# Patient Record
Sex: MALE | Race: WHITE | NOT HISPANIC OR LATINO | Employment: OTHER | URBAN - METROPOLITAN AREA
[De-identification: names, ages, dates, MRNs, and addresses within clinical notes are randomized per-mention and may not be internally consistent; named-entity substitution may affect disease eponyms.]

---

## 2019-04-25 ENCOUNTER — OFFICE VISIT (OUTPATIENT)
Dept: URGENT CARE | Facility: CLINIC | Age: 59
End: 2019-04-25

## 2019-04-25 VITALS
HEIGHT: 68 IN | BODY MASS INDEX: 23.19 KG/M2 | OXYGEN SATURATION: 99 % | TEMPERATURE: 99 F | WEIGHT: 153 LBS | HEART RATE: 96 BPM | DIASTOLIC BLOOD PRESSURE: 83 MMHG | SYSTOLIC BLOOD PRESSURE: 127 MMHG

## 2019-04-25 DIAGNOSIS — N44.00 TORSION OF LEFT TESTICLE: ICD-10-CM

## 2019-04-25 DIAGNOSIS — N45.1 EPIDIDYMITIS: Primary | ICD-10-CM

## 2019-04-25 PROCEDURE — 99203 PR OFFICE/OUTPT VISIT, NEW, LEVL III, 30-44 MIN: ICD-10-PCS | Mod: S$GLB,,, | Performed by: INTERNAL MEDICINE

## 2019-04-25 PROCEDURE — 99203 OFFICE O/P NEW LOW 30 MIN: CPT | Mod: S$GLB,,, | Performed by: INTERNAL MEDICINE

## 2019-04-25 RX ORDER — SULFAMETHOXAZOLE AND TRIMETHOPRIM 800; 160 MG/1; MG/1
1 TABLET ORAL 2 TIMES DAILY
Qty: 14 TABLET | Refills: 0 | Status: SHIPPED | OUTPATIENT
Start: 2019-04-25 | End: 2019-05-02

## 2019-04-25 RX ORDER — NAPROXEN 500 MG/1
500 TABLET ORAL 2 TIMES DAILY WITH MEALS
Qty: 25 TABLET | Refills: 0 | Status: SHIPPED | OUTPATIENT
Start: 2019-04-25 | End: 2019-05-01

## 2019-04-25 NOTE — PATIENT INSTRUCTIONS
Testicular Appendage Torsion  Testicular appendage torsion is the twisting of a small piece of tissue above a testicle. The appendage doesnt have a function in the body. But it can twist and cause pain and swelling that gets worse over time. It is not the same as testicular torsion. It is not a medical emergency.       Normal testicle Testicular appendage torsion   What causes testicular appendage torsion?  Torsion can happen at any time. Its most likely to happen during sleep. When the appendage gets twisted, it cuts off its own blood supply. This doesnt cause any serious damage.  Is it the same as testicular torsion?  Testicular appendage torsion is not the same thing as testicular torsion. Testicular torsion is the twisting of the testicle. This is a medical emergency. The torsion causes a loss of blood supply to the testicle. Surgery is needed right away to prevent permanent damage. The symptoms can be similar in both conditions. But the pain of testicular torsion is often more severe.  Symptoms of testicular appendage torsion  Symptoms can include:  · Pain in one testicle, on one side of the scrotum  · Swelling and redness of the scrotum  · Scrotum thats sore to the touch  · A hard lump at the top of the scrotum  · A blue dot at the top of the scrotum. This shows that the twist is in the appendage, not the testicle.  How is testicular appendage torsion diagnosed?  Your health care provider will ask about your health history and your symptoms. Youll be given a physical exam. You may also have tests such as:  · Urine test. This is to check for other possible causes of scrotal pain such as infection.  · Imaging test of your scrotum. This may include a radionuclide scan or an ultrasound.  In some cases you may need surgery right away if it appears you may have testicular torsion. This is to help prevent severe problems. During surgery, the health care provider will be able to see if the condition is  testicular appendage torsion.  Treatment for testicular appendage torsion  Treatment for testicular appendage torsion includes:  · Rest  · Raising the area to help ease swelling  · Over-the-counter pain medicine     When to call the health care provider  Call your health care provider if you have symptoms that dont get better, or get worse.   Date Last Reviewed: 6/10/2015  © 3527-7557 [employer]. [address] [place] [address]. All rights reserved. This information is not intended as a substitute for professional medical care. Always follow your healthcare professional's instructions.    Please return here or go to the Emergency Department for any concerns or worsening of condition.  If you were prescribed antibiotics, please take them to completion.  If you were prescribed a narcotic medication, do not drive or operate heavy equipment or machinery while taking these medications.  Please follow up with your primary care doctor or specialist as needed.    If you  smoke, please stop smoking.      GO TO THE EMERGENCY ROOM NOW

## 2019-04-25 NOTE — PROGRESS NOTES
"Subjective:       Patient ID: Jerman Ayon Jr. is a 59 y.o. male.    Vitals:  height is 5' 8" (1.727 m) and weight is 69.4 kg (153 lb). His tympanic temperature is 98.5 °F (36.9 °C). His blood pressure is 127/83 and his pulse is 96. His oxygen saturation is 99%.     Chief Complaint: Groin Swelling (Left)    Patient has swollen testicles bilaterally      Constitution: Negative for chills, fatigue and fever.   HENT: Negative for congestion and sore throat.    Neck: Negative for painful lymph nodes.   Cardiovascular: Negative for chest pain and leg swelling.   Eyes: Negative for double vision and blurred vision.   Respiratory: Negative for cough and shortness of breath.    Gastrointestinal: Negative for nausea, vomiting and diarrhea.   Genitourinary: Negative for dysuria, frequency and urgency.   Musculoskeletal: Negative for joint pain, joint swelling, muscle cramps and muscle ache.   Skin: Negative for color change, pale and rash.   Allergic/Immunologic: Negative for seasonal allergies.   Neurological: Negative for dizziness, history of vertigo, light-headedness, passing out and headaches.   Hematologic/Lymphatic: Negative for swollen lymph nodes, easy bruising/bleeding and history of blood clots. Does not bruise/bleed easily.   Psychiatric/Behavioral: Negative for nervous/anxious, sleep disturbance and depression. The patient is not nervous/anxious.        Objective:      Physical Exam   Constitutional: He is oriented to person, place, and time. He appears well-developed and well-nourished. He is cooperative.  Non-toxic appearance. He does not appear ill. No distress.   HENT:   Head: Normocephalic and atraumatic.   Right Ear: Hearing, tympanic membrane, external ear and ear canal normal. Tympanic membrane is not injected and not erythematous.   Left Ear: Hearing, tympanic membrane, external ear and ear canal normal. Tympanic membrane is not injected and not erythematous.   Nose: Nose normal. No mucosal edema, " rhinorrhea or nasal deformity. No epistaxis. Right sinus exhibits no maxillary sinus tenderness and no frontal sinus tenderness. Left sinus exhibits no maxillary sinus tenderness and no frontal sinus tenderness.   Mouth/Throat: Uvula is midline, oropharynx is clear and moist and mucous membranes are normal. No trismus in the jaw. Normal dentition. No uvula swelling. No posterior oropharyngeal erythema.       Eyes: Conjunctivae and lids are normal. No scleral icterus.   Sclera clear bilat   Neck: Trachea normal, full passive range of motion without pain and phonation normal. Neck supple.   Cardiovascular: Normal rate, regular rhythm, normal heart sounds, intact distal pulses and normal pulses.   Pulmonary/Chest: Effort normal and breath sounds normal. No respiratory distress.   Abdominal: Soft. Normal appearance and bowel sounds are normal. He exhibits no distension. There is no hepatosplenomegaly. There is no tenderness. There is no rigidity, no rebound, no guarding, no CVA tenderness, no tenderness at McBurney's point and negative Solis's sign. No hernia. Hernia confirmed negative in the right inguinal area and confirmed negative in the left inguinal area.       Genitourinary: Right testis shows swelling (mild). Right testis shows no mass. Right testis is descended. Cremasteric reflex is not absent on the right side. Left testis shows swelling (severe swelling) and tenderness (severe). Left testis shows no mass. Left testis is descended. Cremasteric reflex is absent (??) on the left side. Circumcised. No phimosis, paraphimosis, hypospadias, penile erythema or penile tenderness. Discharge (clear) found.         Musculoskeletal: Normal range of motion. He exhibits no edema or deformity.   Neurological: He is alert and oriented to person, place, and time. He exhibits normal muscle tone. Coordination normal.   Skin: Skin is warm, dry and intact. He is not diaphoretic. No pallor.   Psychiatric: He has a normal mood and  affect. His speech is normal and behavior is normal. Judgment and thought content normal. Cognition and memory are normal.   Nursing note and vitals reviewed.      Assessment:       1. Epididymitis    2. Torsion of left testicle        Plan:         Epididymitis  -     Ambulatory referral to Smoking Cessation Program  -     sulfamethoxazole-trimethoprim 800-160mg (BACTRIM DS) 800-160 mg Tab; Take 1 tablet by mouth 2 (two) times daily. for 7 days  Dispense: 14 tablet; Refill: 0  -     naproxen (NAPROSYN) 500 MG tablet; Take 1 tablet (500 mg total) by mouth 2 (two) times daily with meals. for 12 doses  Dispense: 25 tablet; Refill: 0    Torsion of left testicle    take meds

## 2024-01-04 ENCOUNTER — HOSPITAL ENCOUNTER (OUTPATIENT)
Dept: RADIOLOGY | Facility: HOSPITAL | Age: 64
Discharge: HOME | End: 2024-01-04
Attending: PHYSICIAN ASSISTANT
Payer: MEDICAID

## 2024-01-04 ENCOUNTER — OFFICE VISIT (OUTPATIENT)
Dept: ORTHOPEDICS | Facility: CLINIC | Age: 64
End: 2024-01-04
Payer: MEDICAID

## 2024-01-04 VITALS
WEIGHT: 147.63 LBS | HEART RATE: 69 BPM | HEIGHT: 68 IN | BODY MASS INDEX: 22.37 KG/M2 | OXYGEN SATURATION: 99 % | SYSTOLIC BLOOD PRESSURE: 126 MMHG | DIASTOLIC BLOOD PRESSURE: 82 MMHG

## 2024-01-04 DIAGNOSIS — M25.511 CHRONIC RIGHT SHOULDER PAIN: Primary | ICD-10-CM

## 2024-01-04 DIAGNOSIS — M25.511 RIGHT SHOULDER PAIN, UNSPECIFIED CHRONICITY: Primary | ICD-10-CM

## 2024-01-04 DIAGNOSIS — M25.511 RIGHT SHOULDER PAIN, UNSPECIFIED CHRONICITY: ICD-10-CM

## 2024-01-04 DIAGNOSIS — G89.29 CHRONIC RIGHT SHOULDER PAIN: Primary | ICD-10-CM

## 2024-01-04 PROCEDURE — 99999 PR PBB SHADOW E&M-NEW PATIENT-LVL III: CPT | Mod: PBBFAC,,, | Performed by: PHYSICIAN ASSISTANT

## 2024-01-04 PROCEDURE — 20610 DRAIN/INJ JOINT/BURSA W/O US: CPT | Mod: PBBFAC,RT | Performed by: PHYSICIAN ASSISTANT

## 2024-01-04 PROCEDURE — 1159F MED LIST DOCD IN RCRD: CPT | Mod: CPTII,,, | Performed by: PHYSICIAN ASSISTANT

## 2024-01-04 PROCEDURE — 73030 X-RAY EXAM OF SHOULDER: CPT | Mod: 26,RT,, | Performed by: RADIOLOGY

## 2024-01-04 PROCEDURE — 1126F AMNT PAIN NOTED NONE PRSNT: CPT | Mod: CPTII,,, | Performed by: PHYSICIAN ASSISTANT

## 2024-01-04 PROCEDURE — 73030 X-RAY EXAM OF SHOULDER: CPT | Mod: TC,RT

## 2024-01-04 PROCEDURE — 99203 OFFICE O/P NEW LOW 30 MIN: CPT | Mod: PBBFAC | Performed by: PHYSICIAN ASSISTANT

## 2024-01-04 PROCEDURE — 3079F DIAST BP 80-89 MM HG: CPT | Mod: CPTII,,, | Performed by: PHYSICIAN ASSISTANT

## 2024-01-04 PROCEDURE — 20610 DRAIN/INJ JOINT/BURSA W/O US: CPT | Mod: S$PBB,RT,, | Performed by: PHYSICIAN ASSISTANT

## 2024-01-04 PROCEDURE — 99203 OFFICE O/P NEW LOW 30 MIN: CPT | Mod: S$PBB,25,, | Performed by: PHYSICIAN ASSISTANT

## 2024-01-04 PROCEDURE — 99999PBSHW PR PBB SHADOW TECHNICAL ONLY FILED TO HB: Mod: PBBFAC,,,

## 2024-01-04 PROCEDURE — 1160F RVW MEDS BY RX/DR IN RCRD: CPT | Mod: CPTII,,, | Performed by: PHYSICIAN ASSISTANT

## 2024-01-04 PROCEDURE — 3074F SYST BP LT 130 MM HG: CPT | Mod: CPTII,,, | Performed by: PHYSICIAN ASSISTANT

## 2024-01-04 PROCEDURE — 3008F BODY MASS INDEX DOCD: CPT | Mod: CPTII,,, | Performed by: PHYSICIAN ASSISTANT

## 2024-01-04 RX ORDER — TRIAMCINOLONE ACETONIDE 40 MG/ML
40 INJECTION, SUSPENSION INTRA-ARTICULAR; INTRAMUSCULAR ONCE
Status: COMPLETED | OUTPATIENT
Start: 2024-01-04 | End: 2024-01-04

## 2024-01-04 RX ORDER — CIPROFLOXACIN 500 MG/1
TABLET ORAL
COMMUNITY
Start: 2024-01-03 | End: 2024-02-15

## 2024-01-04 RX ORDER — MELOXICAM 15 MG/1
15 TABLET ORAL DAILY
Qty: 30 TABLET | Refills: 0 | Status: SHIPPED | OUTPATIENT
Start: 2024-01-04 | End: 2024-02-15

## 2024-01-04 RX ORDER — ERGOCALCIFEROL 1.25 MG/1
50000 CAPSULE ORAL
COMMUNITY
Start: 2023-11-15 | End: 2024-02-15

## 2024-01-04 RX ADMIN — TRIAMCINOLONE ACETONIDE 40 MG: 40 INJECTION, SUSPENSION INTRA-ARTICULAR; INTRAMUSCULAR at 09:01

## 2024-01-04 NOTE — PROGRESS NOTES
Subjective:      Patient ID: Jerman Ayon Jr. is a 63 y.o. male.    Chief Complaint: Pain and Injury of the Right Shoulder (October of 2022 he fell off the deck of a boat and thought his injury would heal on its own. States he's never seen anyone for this issue and that the pain has gotten better but he still has some days where he's hurting. States no stiffness or swelling.)    Review of patient's allergies indicates:  No Known Allergies   64 yo M presents to clinic with c/o right shoulder pain x 1 year.  He reports that he fell on a boat deck and injured his shoulder, has been having pain off and on since then.  Pain is achy/sharp and located mostly to posterior shoulder.  Worse with reaching overhead and behind back and improves some with rest.  No radiation of pain.  No neck pain or injury.  He has not tried any treatment for this yet.        Review of Systems   Constitutional: Negative for chills, diaphoresis and fever.   HENT:  Negative for congestion, ear discharge and ear pain.    Eyes:  Negative for blurred vision, discharge, double vision and pain.   Cardiovascular:  Negative for chest pain, claudication and cyanosis.   Respiratory:  Negative for cough, hemoptysis and shortness of breath.    Endocrine: Negative for cold intolerance and heat intolerance.   Skin:  Negative for color change, dry skin, itching and rash.   Musculoskeletal:  Positive for joint pain. Negative for arthritis, back pain, falls, gout, joint swelling, muscle weakness and neck pain.   Gastrointestinal:  Negative for abdominal pain and change in bowel habit.   Neurological:  Negative for brief paralysis, disturbances in coordination, dizziness, numbness and paresthesias.   Psychiatric/Behavioral:  Negative for altered mental status and depression.          Objective:          General    Constitutional: He is oriented to person, place, and time. He appears well-developed and well-nourished. No distress.   HENT:   Head: Atraumatic.    Eyes: EOM are normal. Right eye exhibits no discharge. Left eye exhibits no discharge.   Cardiovascular:  Normal rate.            Pulmonary/Chest: Effort normal. No respiratory distress.   Abdominal: Soft.   Neurological: He is alert and oriented to person, place, and time.   Psychiatric: He has a normal mood and affect. His behavior is normal.         Right Shoulder Exam     Inspection/Observation   Swelling: absent  Bruising: absent  Scars: absent  Deformity: absent    Range of Motion   Active abduction:  normal   Passive abduction:  normal   Forward Flexion:  normal   Forward Elevation: normal  Internal rotation 0 degrees:  abnormal     Tests & Signs   Crespo test: positive  Impingement: positive  Lift Off Sign: positive  Belly Press: positive    Other   Sensation: normal    Comments:  Tenderness to posterior shoulder  Positive empty can    Left Shoulder Exam     Inspection/Observation   Swelling: absent  Bruising: absent  Scars: absent  Deformity: absent    Range of Motion   Active abduction:  normal   Passive abduction:  normal   Forward Flexion:  normal   Forward Elevation: normal  Internal rotation 0 degrees:  normal     Tests & Signs   Crespo test: negative  Impingement: negative  Lift Off Sign: negative  Belly Press: negative    Other   Sensation: normal       Vascular Exam       Capillary Refill  Right Hand: normal capillary refill  Left Hand: normal capillary refill                  Assessment:         Xray Right Shoulder 1/4/24:  The alignment is within normal limits.  No displaced fractures identified.  No evidence of lytic or blastic lesions.Mild degenerative changes of the acromioclavicular and glenohumeral joints.Soft tissues are unremarkable       Encounter Diagnosis   Name Primary?    Chronic right shoulder pain Yes    Chronic right shoulder pain  -     triamcinolone acetonide injection 40 mg               Plan:         The total face-to-face encounter time with this patient was 30 minutes and  greater than 50% of of the encounter time was spent counseling the patient, coordinating care, and education regarding the diagnosis. We have discussed a variety of treatment options including medications, injections, physical therapy and other alternative treatments. I also explained the indications, risks and benefits of surgery. Patient chooses to proceed with CSI and home exercise program at this time.  Today, the patient chooses  a CSI and understands a minimum of 3 months time must lapse after injection, prior to a surgical procedure due to increased risk of infection.     I made the decision to obtain old records of the patient including previous notes and imaging. New imaging was ordered today of the extremity or extremities evaluated.       1. Injection Procedure  A time out was performed, including verification of patient ID, procedure, site and side, availability of information and equipment, review of safety issues, and agreement with consent, the procedure site was marked.    After time out was performed, the patient was prepped aseptically with chloraprep swabstick. A diagnostic and therapeutic injection of 1:3cc Kenalog/Marcaine was given under sterile technique using a 22g x 1.5 needle from the Posterior  aspect of the right Subacromial in the sitting position.      Jerman Ayon Jr. had no adverse reactions to the medication. Pain decreased. He was instructed to apply ice to the joint for 20 minutes and avoid strenuous activities for 24-36 hours following the injection. He was warned of possible blood sugar and/or blood pressure changes during that time. Following that time, he can resume regular activities.    He was reminded to call the clinic immediately for any adverse side effects as explained in clinic today.    2. Ice compress to the affected area 2-3x a day for 15-20 minutes as needed for pain management.  3. Mobic 15 mg 1 time daily PRN for pain management.   4. Rotator cuff conditioning  home exercise program. [facility] handout of exercises provided to patient.   5. RTC in 6 weeks for follow-up, sooner if needed. Consider MRI if not improved.    Patient voices understanding of and agreement with treatment plan. All of the patient's questions were answered and the patient will contact us if he has any questions or concerns in the interim.

## 2024-02-15 ENCOUNTER — HOSPITAL ENCOUNTER (OUTPATIENT)
Dept: RADIOLOGY | Facility: HOSPITAL | Age: 64
Discharge: HOME | End: 2024-02-15
Attending: PHYSICIAN ASSISTANT
Payer: MEDICAID

## 2024-02-15 ENCOUNTER — OFFICE VISIT (OUTPATIENT)
Dept: ORTHOPEDICS | Facility: CLINIC | Age: 64
End: 2024-02-15
Payer: MEDICAID

## 2024-02-15 VITALS — RESPIRATION RATE: 18 BRPM | WEIGHT: 149.31 LBS | BODY MASS INDEX: 22.63 KG/M2 | HEIGHT: 68 IN

## 2024-02-15 DIAGNOSIS — G89.29 CHRONIC RIGHT SHOULDER PAIN: ICD-10-CM

## 2024-02-15 DIAGNOSIS — M72.0 DUPUYTREN'S CONTRACTURE OF LEFT HAND: Primary | ICD-10-CM

## 2024-02-15 DIAGNOSIS — M25.511 CHRONIC RIGHT SHOULDER PAIN: ICD-10-CM

## 2024-02-15 DIAGNOSIS — M79.642 LEFT HAND PAIN: ICD-10-CM

## 2024-02-15 PROCEDURE — 4010F ACE/ARB THERAPY RXD/TAKEN: CPT | Mod: CPTII,,, | Performed by: PHYSICIAN ASSISTANT

## 2024-02-15 PROCEDURE — 99214 OFFICE O/P EST MOD 30 MIN: CPT | Mod: S$PBB,,, | Performed by: PHYSICIAN ASSISTANT

## 2024-02-15 PROCEDURE — 1160F RVW MEDS BY RX/DR IN RCRD: CPT | Mod: CPTII,,, | Performed by: PHYSICIAN ASSISTANT

## 2024-02-15 PROCEDURE — 1101F PT FALLS ASSESS-DOCD LE1/YR: CPT | Mod: CPTII,,, | Performed by: PHYSICIAN ASSISTANT

## 2024-02-15 PROCEDURE — 1125F AMNT PAIN NOTED PAIN PRSNT: CPT | Mod: CPTII,,, | Performed by: PHYSICIAN ASSISTANT

## 2024-02-15 PROCEDURE — 3288F FALL RISK ASSESSMENT DOCD: CPT | Mod: CPTII,,, | Performed by: PHYSICIAN ASSISTANT

## 2024-02-15 PROCEDURE — 3008F BODY MASS INDEX DOCD: CPT | Mod: CPTII,,, | Performed by: PHYSICIAN ASSISTANT

## 2024-02-15 PROCEDURE — 99213 OFFICE O/P EST LOW 20 MIN: CPT | Mod: PBBFAC,25 | Performed by: PHYSICIAN ASSISTANT

## 2024-02-15 PROCEDURE — 73130 X-RAY EXAM OF HAND: CPT | Mod: 26,LT,, | Performed by: RADIOLOGY

## 2024-02-15 PROCEDURE — 99999 PR PBB SHADOW E&M-EST. PATIENT-LVL III: CPT | Mod: PBBFAC,,, | Performed by: PHYSICIAN ASSISTANT

## 2024-02-15 PROCEDURE — 1159F MED LIST DOCD IN RCRD: CPT | Mod: CPTII,,, | Performed by: PHYSICIAN ASSISTANT

## 2024-02-15 PROCEDURE — 73130 X-RAY EXAM OF HAND: CPT | Mod: TC,LT

## 2024-02-15 RX ORDER — LISINOPRIL 10 MG/1
TABLET ORAL
COMMUNITY
Start: 2024-01-19

## 2024-02-15 RX ORDER — NAPROXEN SODIUM 220 MG/1
TABLET, FILM COATED ORAL
COMMUNITY

## 2024-02-15 NOTE — PROGRESS NOTES
Subjective:      Patient ID: Jerman Ayon Jr. is a 63 y.o. male.    Chief Complaint: Pain of the Right Shoulder and Pain and Numbness of the Left Hand    Review of patient's allergies indicates:  No Known Allergies   Pt returns to clinic for follow up of right shoulder pain.  Pain has improved following CSI at last visit.  He says that he does not need any further treatment for shoulder at this time.    Today he is asking to be seen for left hand pain.  Diffuse achy hand pian started years ago.  No injury or trauma.  He has also had flexion contractures of some of his fingers develop over past few years.  No swelling, redness, warmth.  No numbness/tingling.      1/4/24:  64 yo M presents to clinic with c/o right shoulder pain x 1 year.  He reports that he fell on a boat deck and injured his shoulder, has been having pain off and on since then.  Pain is achy/sharp and located mostly to posterior shoulder.  Worse with reaching overhead and behind back and improves some with rest.  No radiation of pain.  No neck pain or injury.  He has not tried any treatment for this yet.        Review of Systems   Constitutional: Negative for chills, diaphoresis and fever.   HENT:  Negative for congestion, ear discharge and ear pain.    Eyes:  Negative for blurred vision, discharge, double vision and pain.   Cardiovascular:  Negative for chest pain, claudication and cyanosis.   Respiratory:  Negative for cough, hemoptysis and shortness of breath.    Endocrine: Negative for cold intolerance and heat intolerance.   Skin:  Negative for color change, dry skin, itching and rash.   Musculoskeletal:  Positive for joint pain. Negative for arthritis, back pain, falls, gout, joint swelling, muscle weakness and neck pain.   Gastrointestinal:  Negative for abdominal pain and change in bowel habit.   Neurological:  Negative for brief paralysis, disturbances in coordination, dizziness, numbness and paresthesias.   Psychiatric/Behavioral:  Negative  for altered mental status and depression.          Objective:          General    Constitutional: He is oriented to person, place, and time. He appears well-developed and well-nourished. No distress.   HENT:   Head: Atraumatic.   Eyes: EOM are normal. Right eye exhibits no discharge. Left eye exhibits no discharge.   Cardiovascular:  Normal rate.            Pulmonary/Chest: Effort normal. No respiratory distress.   Abdominal: Soft.   Neurological: He is alert and oriented to person, place, and time.   Psychiatric: He has a normal mood and affect. His behavior is normal.             Right Hand/Wrist Exam     Tests   Phalens Sign: negative  Tinel's sign (median nerve): negative  Finkelstein's test: negative  Carpal Tunnel Compression Test: negative  Cubital Tunnel Compression Test: negative      Other     Neuorologic Exam    Median Distribution: normal  Ulnar Distribution: normal  Radial Distribution: normal      Left Hand/Wrist Exam     Tests   Phalens Sign: negative  Tinel's sign (median nerve): negative  Finkelstein's test: negative  Carpal Tunnel Compression Test: negative  Cubital Tunnel Compression Test: negative      Other     Sensory Exam  Median Distribution: normal  Ulnar Distribution: normal  Radial Distribution: normal    Comments:  Flexion contractures to small and middle fingers      Right Elbow Exam     Tests   Tinel's sign (cubital tunnel): negative      Left Elbow Exam     Tests   Tinel's sign (cubital tunnel): negative    Right Shoulder Exam     Inspection/Observation   Swelling: absent  Bruising: absent  Scars: absent  Deformity: absent    Range of Motion   Active abduction:  normal   Passive abduction:  normal   Forward Flexion:  normal   Forward Elevation: normal  Internal rotation 0 degrees:  abnormal     Tests & Signs   Crespo test: negative  Impingement: negative  Lift Off Sign: negative  Belly Press: negative    Other   Sensation: normal    Comments:  No tenderness    Left Shoulder Exam      Inspection/Observation   Swelling: absent  Bruising: absent  Scars: absent  Deformity: absent    Range of Motion   Active abduction:  normal   Passive abduction:  normal   Forward Flexion:  normal   Forward Elevation: normal  Internal rotation 0 degrees:  normal     Tests & Signs   Crespo test: negative  Impingement: negative  Lift Off Sign: negative  Belly Press: negative    Other   Sensation: normal       Vascular Exam       Capillary Refill  Right Hand: normal capillary refill  Left Hand: normal capillary refill                  Assessment:         Xray Right Shoulder 1/4/24:  The alignment is within normal limits.  No displaced fractures identified.  No evidence of lytic or blastic lesions.Mild degenerative changes of the acromioclavicular and glenohumeral joints.Soft tissues are unremarkable       Encounter Diagnoses   Name Primary?    Dupuytren's contracture of left hand Yes    Left hand pain     Chronic right shoulder pain     Dupuytren's contracture of left hand    Left hand pain  -     X-Ray Hand Complete Left; Future; Expected date: 02/15/2024    Chronic right shoulder pain               Plan:         The total face-to-face encounter time with this patient was 30 minutes and greater than 50% of of the encounter time was spent counseling the patient, coordinating care, and education regarding the diagnosis. We have discussed a variety of treatment options including medications, injections, physical therapy and other alternative treatments. I also explained the indications, risks and benefits of surgery. Pt says that his shoulder pain is improved and that he does not need any further treatment for shoulder pain at this time. He declines shoulder MRI.  We also discussed hand pain/contracture and treatment options. He would like to schedule appointment with surgeon.    I made the decision to obtain old records of the patient including previous notes and imaging. New imaging was ordered today of the  extremity or extremities evaluated.         Appointment with Dr. Alvarado.  2.  Ice compress to the affected area 2-3x a day for 15-20 minutes as needed for pain management.  3. Mobic 15 mg 1 time daily PRN for pain management.   4. Continue rotator cuff conditioning home exercise program.  5. RTC as scheduled, sooner if needed.    Patient voices understanding of and agreement with treatment plan. All of the patient's questions were answered and the patient will contact us if he has any questions or concerns in the interim.

## 2024-04-24 ENCOUNTER — OFFICE VISIT (OUTPATIENT)
Dept: ORTHOPEDICS | Facility: CLINIC | Age: 64
End: 2024-04-24
Payer: MEDICAID

## 2024-04-24 DIAGNOSIS — M72.0 DUPUYTREN'S CONTRACTURE OF LEFT HAND: Primary | ICD-10-CM

## 2024-04-24 PROCEDURE — 4010F ACE/ARB THERAPY RXD/TAKEN: CPT | Mod: CPTII,,, | Performed by: ORTHOPAEDIC SURGERY

## 2024-04-24 PROCEDURE — 99204 OFFICE O/P NEW MOD 45 MIN: CPT | Mod: S$PBB,,, | Performed by: ORTHOPAEDIC SURGERY

## 2024-04-24 RX ORDER — CEFAZOLIN SODIUM 2 G/50ML
2 SOLUTION INTRAVENOUS
OUTPATIENT
Start: 2024-04-24

## 2024-04-24 NOTE — PROGRESS NOTES
CC:  Dupuytren contracture left hand involving middle and small fingers        HPI:  Jerman Ayon Jr. is a very pleasant 64 y.o. male with worsening contracture left hand for the past several years   Mostly involving the left small finger he also has involvement of the middle finger   Symptoms worse with use   No numbness or tingling except for the small finger on the ulnar border         PAST MEDICAL HISTORY: No past medical history on file.  PAST SURGICAL HISTORY:   Past Surgical History:   Procedure Laterality Date    EXCISION OF ZENKER'S DIVERTICULUM       FAMILY HISTORY: No family history on file.  SOCIAL HISTORY:   Social History     Socioeconomic History    Marital status:    Tobacco Use    Smoking status: Every Day     Current packs/day: 1.00     Average packs/day: 1 pack/day for 40.0 years (40.0 ttl pk-yrs)     Types: Cigarettes    Smokeless tobacco: Never       MEDICATIONS:   Current Outpatient Medications:     aspirin 81 MG Chew, 1 tablet Orally Once a day, Disp: , Rfl:     lisinopriL 10 MG tablet, 1 tablet Orally Once a day for 90 days, Disp: , Rfl:   ALLERGIES: Review of patient's allergies indicates:  No Known Allergies    Review of Systems:  Constitutional: no fever or chills  ENT: no nasal congestion or sore throat  Respiratory: no cough or shortness of breath  Cardiovascular: no chest pain or palpitations  Gastrointestinal: no nausea or vomiting, PUD, GERD, NSAID intolerance  Genitourinary: no hematuria or dysuria  Integument/Breast: no rash or pruritis  Hematologic/Lymphatic: no easy bruising or lymphadenopathy  Musculoskeletal: see HPI  Neurological: no seizures or tremors  Behavioral/Psych: no auditory or visual hallucinations      Physical Exam   There were no vitals filed for this visit.    Constitutional: Oriented to person, place, and time. Appears well-developed and well-nourished.   HENT:   Head: Normocephalic and atraumatic.   Nose: Nose normal.   Eyes: No scleral icterus.   Neck:  "Normal range of motion.   Cardiovascular: Normal rate and regular rhythm.    Pulses:       Radial pulses are 2+ on the right side, and 2+ on the left side.   Pulmonary/Chest: Effort normal and breath sounds normal.   Abdominal: Soft.   Neurological: Alert and oriented to person, place, and time.   Skin: Skin is warm.   Psychiatric: Normal mood and affect.     MUSCULOSKELETAL UPPER EXTREMITY:  Examination left hand he does have Dupuytren's contracture significant cord going into the small finger causing contracture of the MP and PIP joints about 45°   Slightly decreased sensation over the ulnar border of the small finger he has good flexion a 2nd cord is noted in the mid palm to the base of the middle finger causing a slight contracture of the MP joint   Positive tabletop test Tinel sign negative at the wrist            Diagnostic Studies:  None        Assessment:  Dupuytren contracture left hand involving middle finger and small finger    Plan:  I explained the nature of the problem to the patient   I have recommended surgical treatment for Dupuytren's excision   He would like to set this up as an outpatient surgery      The risks and benefits of surgery were discussed with the patient today and they understand.  The consent was signed in the office for surgery.      Ranjan Alvarado MD ([place])  [facility]  Orthopedic Upper Extremity Surgery       "

## 2024-06-20 ENCOUNTER — TELEPHONE (OUTPATIENT)
Dept: ORTHOPEDICS | Facility: CLINIC | Age: 64
End: 2024-06-20
Payer: MEDICAID

## 2024-06-20 NOTE — TELEPHONE ENCOUNTER
Contacted patient and confirmed 06/26/2024 procedure with Dr. Alvarado. Clearance received from [facility] to hold ASA for 5 days. Patient notified. Voiced understanding.

## 2024-06-26 ENCOUNTER — HOSPITAL ENCOUNTER (OUTPATIENT)
Facility: HOSPITAL | Age: 64
Discharge: HOME | End: 2024-06-26
Attending: ORTHOPAEDIC SURGERY | Admitting: ORTHOPAEDIC SURGERY
Payer: MEDICAID

## 2024-06-26 ENCOUNTER — ANESTHESIA EVENT (OUTPATIENT)
Dept: SURGERY | Facility: HOSPITAL | Age: 64
End: 2024-06-26
Payer: MEDICAID

## 2024-06-26 ENCOUNTER — ANESTHESIA (OUTPATIENT)
Dept: SURGERY | Facility: HOSPITAL | Age: 64
End: 2024-06-26
Payer: MEDICAID

## 2024-06-26 VITALS
SYSTOLIC BLOOD PRESSURE: 102 MMHG | DIASTOLIC BLOOD PRESSURE: 68 MMHG | HEART RATE: 69 BPM | OXYGEN SATURATION: 93 % | RESPIRATION RATE: 12 BRPM | TEMPERATURE: 98 F

## 2024-06-26 DIAGNOSIS — M72.0 DUPUYTREN'S CONTRACTURE OF LEFT HAND: Primary | ICD-10-CM

## 2024-06-26 PROCEDURE — 25000003 PHARM REV CODE 250: Performed by: ORTHOPAEDIC SURGERY

## 2024-06-26 PROCEDURE — 36000706: Performed by: ORTHOPAEDIC SURGERY

## 2024-06-26 PROCEDURE — 71000033 HC RECOVERY, INTIAL HOUR: Performed by: ORTHOPAEDIC SURGERY

## 2024-06-26 PROCEDURE — 37000008 HC ANESTHESIA 1ST 15 MINUTES: Performed by: ORTHOPAEDIC SURGERY

## 2024-06-26 PROCEDURE — 26123 RELEASE PALM CONTRACTURE: CPT | Mod: F2,,, | Performed by: ORTHOPAEDIC SURGERY

## 2024-06-26 PROCEDURE — 25000003 PHARM REV CODE 250: Performed by: NURSE ANESTHETIST, CERTIFIED REGISTERED

## 2024-06-26 PROCEDURE — 63600175 PHARM REV CODE 636 W HCPCS: Performed by: NURSE ANESTHETIST, CERTIFIED REGISTERED

## 2024-06-26 PROCEDURE — 25111 REMOVE WRIST TENDON LESION: CPT | Mod: 51,RT,, | Performed by: ORTHOPAEDIC SURGERY

## 2024-06-26 PROCEDURE — 37000009 HC ANESTHESIA EA ADD 15 MINS: Performed by: ORTHOPAEDIC SURGERY

## 2024-06-26 PROCEDURE — 26125 RELEASE PALM CONTRACTURE: CPT | Mod: F3,,, | Performed by: ORTHOPAEDIC SURGERY

## 2024-06-26 PROCEDURE — 63600175 PHARM REV CODE 636 W HCPCS: Mod: JZ,TB | Performed by: ORTHOPAEDIC SURGERY

## 2024-06-26 PROCEDURE — 63600175 PHARM REV CODE 636 W HCPCS: Performed by: ORTHOPAEDIC SURGERY

## 2024-06-26 PROCEDURE — 88304 TISSUE EXAM BY PATHOLOGIST: CPT | Performed by: PATHOLOGY

## 2024-06-26 PROCEDURE — 36000707: Performed by: ORTHOPAEDIC SURGERY

## 2024-06-26 RX ORDER — OXYCODONE HYDROCHLORIDE 5 MG/1
10 TABLET ORAL EVERY 4 HOURS PRN
Status: DISCONTINUED | OUTPATIENT
Start: 2024-06-26 | End: 2024-06-26 | Stop reason: HOSPADM

## 2024-06-26 RX ORDER — HYDROCODONE BITARTRATE AND ACETAMINOPHEN 5; 325 MG/1; MG/1
1 TABLET ORAL EVERY 4 HOURS PRN
Qty: 20 TABLET | Refills: 0 | Status: SHIPPED | OUTPATIENT
Start: 2024-06-26

## 2024-06-26 RX ORDER — MIDAZOLAM HYDROCHLORIDE 1 MG/ML
INJECTION INTRAMUSCULAR; INTRAVENOUS
Status: DISCONTINUED | OUTPATIENT
Start: 2024-06-26 | End: 2024-06-26

## 2024-06-26 RX ORDER — HYDROCODONE BITARTRATE AND ACETAMINOPHEN 5; 325 MG/1; MG/1
1 TABLET ORAL EVERY 4 HOURS PRN
Status: DISCONTINUED | OUTPATIENT
Start: 2024-06-26 | End: 2024-06-26 | Stop reason: HOSPADM

## 2024-06-26 RX ORDER — DEXAMETHASONE SODIUM PHOSPHATE 4 MG/ML
INJECTION, SOLUTION INTRA-ARTICULAR; INTRALESIONAL; INTRAMUSCULAR; INTRAVENOUS; SOFT TISSUE
Status: DISCONTINUED | OUTPATIENT
Start: 2024-06-26 | End: 2024-06-26

## 2024-06-26 RX ORDER — FENTANYL CITRATE 50 UG/ML
INJECTION, SOLUTION INTRAMUSCULAR; INTRAVENOUS
Status: DISCONTINUED | OUTPATIENT
Start: 2024-06-26 | End: 2024-06-26

## 2024-06-26 RX ORDER — SODIUM CHLORIDE, SODIUM LACTATE, POTASSIUM CHLORIDE, CALCIUM CHLORIDE 600; 310; 30; 20 MG/100ML; MG/100ML; MG/100ML; MG/100ML
INJECTION, SOLUTION INTRAVENOUS CONTINUOUS PRN
Status: DISCONTINUED | OUTPATIENT
Start: 2024-06-26 | End: 2024-06-26

## 2024-06-26 RX ORDER — LIDOCAINE HYDROCHLORIDE 10 MG/ML
INJECTION INFILTRATION; PERINEURAL
Status: DISCONTINUED | OUTPATIENT
Start: 2024-06-26 | End: 2024-06-26 | Stop reason: HOSPADM

## 2024-06-26 RX ORDER — ONDANSETRON 8 MG/1
8 TABLET, ORALLY DISINTEGRATING ORAL EVERY 8 HOURS PRN
Status: DISCONTINUED | OUTPATIENT
Start: 2024-06-26 | End: 2024-06-26 | Stop reason: HOSPADM

## 2024-06-26 RX ORDER — KETOROLAC TROMETHAMINE 30 MG/ML
INJECTION, SOLUTION INTRAMUSCULAR; INTRAVENOUS
Status: DISCONTINUED | OUTPATIENT
Start: 2024-06-26 | End: 2024-06-26

## 2024-06-26 RX ORDER — EPHEDRINE SULFATE 50 MG/ML
INJECTION, SOLUTION INTRAVENOUS
Status: DISCONTINUED | OUTPATIENT
Start: 2024-06-26 | End: 2024-06-26

## 2024-06-26 RX ORDER — PHENYLEPHRINE HYDROCHLORIDE 10 MG/ML
INJECTION INTRAVENOUS
Status: DISCONTINUED | OUTPATIENT
Start: 2024-06-26 | End: 2024-06-26

## 2024-06-26 RX ORDER — ACETAMINOPHEN 10 MG/ML
INJECTION, SOLUTION INTRAVENOUS
Status: DISCONTINUED | OUTPATIENT
Start: 2024-06-26 | End: 2024-06-26

## 2024-06-26 RX ORDER — PROPOFOL 10 MG/ML
VIAL (ML) INTRAVENOUS
Status: DISCONTINUED | OUTPATIENT
Start: 2024-06-26 | End: 2024-06-26

## 2024-06-26 RX ORDER — BUPIVACAINE HYDROCHLORIDE 2.5 MG/ML
INJECTION, SOLUTION EPIDURAL; INFILTRATION; INTRACAUDAL
Status: DISCONTINUED | OUTPATIENT
Start: 2024-06-26 | End: 2024-06-26 | Stop reason: HOSPADM

## 2024-06-26 RX ORDER — ONDANSETRON HYDROCHLORIDE 2 MG/ML
INJECTION, SOLUTION INTRAMUSCULAR; INTRAVENOUS
Status: DISCONTINUED | OUTPATIENT
Start: 2024-06-26 | End: 2024-06-26

## 2024-06-26 RX ORDER — ACETAMINOPHEN 325 MG/1
650 TABLET ORAL EVERY 4 HOURS PRN
Status: DISCONTINUED | OUTPATIENT
Start: 2024-06-26 | End: 2024-06-26 | Stop reason: HOSPADM

## 2024-06-26 RX ORDER — LIDOCAINE HYDROCHLORIDE 20 MG/ML
INJECTION, SOLUTION EPIDURAL; INFILTRATION; INTRACAUDAL; PERINEURAL
Status: DISCONTINUED | OUTPATIENT
Start: 2024-06-26 | End: 2024-06-26

## 2024-06-26 RX ADMIN — ONDANSETRON 4 MG: 2 INJECTION INTRAMUSCULAR; INTRAVENOUS at 12:06

## 2024-06-26 RX ADMIN — FENTANYL CITRATE 100 MCG: 0.05 INJECTION, SOLUTION INTRAMUSCULAR; INTRAVENOUS at 01:06

## 2024-06-26 RX ADMIN — EPHEDRINE SULFATE 5 MG: 50 INJECTION, SOLUTION INTRAMUSCULAR; INTRAVENOUS; SUBCUTANEOUS at 12:06

## 2024-06-26 RX ADMIN — GLYCOPYRROLATE 0.4 MG: 0.2 INJECTION, SOLUTION INTRAMUSCULAR; INTRAVENOUS at 12:06

## 2024-06-26 RX ADMIN — PHENYLEPHRINE HYDROCHLORIDE 100 MCG: 10 INJECTION INTRAVENOUS at 12:06

## 2024-06-26 RX ADMIN — PROPOFOL 100 MG: 10 INJECTION, EMULSION INTRAVENOUS at 12:06

## 2024-06-26 RX ADMIN — EPHEDRINE SULFATE 5 MG: 50 INJECTION, SOLUTION INTRAMUSCULAR; INTRAVENOUS; SUBCUTANEOUS at 01:06

## 2024-06-26 RX ADMIN — PROPOFOL 50 MG: 10 INJECTION, EMULSION INTRAVENOUS at 01:06

## 2024-06-26 RX ADMIN — ACETAMINOPHEN 1000 MG: 10 INJECTION, SOLUTION INTRAVENOUS at 12:06

## 2024-06-26 RX ADMIN — PHENYLEPHRINE HYDROCHLORIDE 100 MCG: 10 INJECTION INTRAVENOUS at 01:06

## 2024-06-26 RX ADMIN — SODIUM CHLORIDE, SODIUM LACTATE, POTASSIUM CHLORIDE, AND CALCIUM CHLORIDE: .6; .31; .03; .02 INJECTION, SOLUTION INTRAVENOUS at 12:06

## 2024-06-26 RX ADMIN — CEFAZOLIN 2 G: 2 INJECTION, POWDER, FOR SOLUTION INTRAMUSCULAR; INTRAVENOUS at 12:06

## 2024-06-26 RX ADMIN — EPHEDRINE SULFATE 10 MG: 50 INJECTION, SOLUTION INTRAMUSCULAR; INTRAVENOUS; SUBCUTANEOUS at 12:06

## 2024-06-26 RX ADMIN — PROPOFOL 30 MG: 10 INJECTION, EMULSION INTRAVENOUS at 01:06

## 2024-06-26 RX ADMIN — PROPOFOL 50 MG: 10 INJECTION, EMULSION INTRAVENOUS at 12:06

## 2024-06-26 RX ADMIN — EPHEDRINE SULFATE 5 MG: 50 INJECTION, SOLUTION INTRAMUSCULAR; INTRAVENOUS; SUBCUTANEOUS at 02:06

## 2024-06-26 RX ADMIN — SODIUM CHLORIDE, SODIUM LACTATE, POTASSIUM CHLORIDE, AND CALCIUM CHLORIDE: .6; .31; .03; .02 INJECTION, SOLUTION INTRAVENOUS at 01:06

## 2024-06-26 RX ADMIN — MIDAZOLAM HYDROCHLORIDE 2 MG: 1 INJECTION, SOLUTION INTRAMUSCULAR; INTRAVENOUS at 12:06

## 2024-06-26 RX ADMIN — FENTANYL CITRATE 100 MCG: 0.05 INJECTION, SOLUTION INTRAMUSCULAR; INTRAVENOUS at 12:06

## 2024-06-26 RX ADMIN — DEXAMETHASONE SODIUM PHOSPHATE 8 MG: 4 INJECTION, SOLUTION INTRAMUSCULAR; INTRAVENOUS at 12:06

## 2024-06-26 RX ADMIN — KETOROLAC TROMETHAMINE 30 MG: 30 INJECTION, SOLUTION INTRAMUSCULAR at 01:06

## 2024-06-26 RX ADMIN — LIDOCAINE HYDROCHLORIDE 100 MG: 20 INJECTION, SOLUTION EPIDURAL; INFILTRATION; INTRACAUDAL; PERINEURAL at 12:06

## 2024-06-26 NOTE — ANESTHESIA POSTPROCEDURE EVALUATION
Anesthesia Post Evaluation    Patient: Jerman Ayon Jr.    Procedure(s) Performed: Procedure(s) (LRB):  RELEASE, DUPUYTREN CONTRACTURE (Left)  EXCISION, CYST (Left)    Final Anesthesia Type: general      Patient location during evaluation: PACU  Patient participation: Yes- Able to Participate  Level of consciousness: awake and alert, oriented and awake  Post-procedure vital signs: reviewed and stable  Pain management: adequate  Airway patency: patent    PONV status at discharge: No PONV  Anesthetic complications: no      Cardiovascular status: blood pressure returned to baseline, hemodynamically stable and stable  Respiratory status: unassisted, spontaneous ventilation and room air  Hydration status: euvolemic  Follow-up not needed.              Vitals Value Taken Time   BP 99/64 06/26/24 1444   Temp 36.6 °C (97.8 °F) 06/26/24 1408   Pulse 63 06/26/24 1444   Resp 13 06/26/24 1444   SpO2 93 % 06/26/24 1444         No case tracking events are documented in the log.      Pain/Damaso Score: Damaso Score: 10 (6/26/2024  2:41 PM)

## 2024-06-26 NOTE — ANESTHESIA PROCEDURE NOTES
Intubation    Date/Time: 6/26/2024 12:18 PM    Performed by: Shad Guerrero CRNA  Authorized by: Shad Guerrero CRNA    Intubation:     Induction:  Intravenous    Intubated:  Postinduction    Mask Ventilation:  Not attempted    Attempts:  1    Attempted By:  Student and CRNA    Difficult Airway Encountered?: No      Complications:  None    Airway Device:  Supraglottic airway/LMA    Airway Device Size:  4.0    Style/Cuff Inflation:  Cuffed (inflated to minimal occlusive pressure)    Placement Verified By:  Capnometry    Complicating Factors:  None    Findings Post-Intubation:  BS equal bilateral and atraumatic/condition of teeth unchanged

## 2024-06-26 NOTE — TRANSFER OF CARE
Anesthesia Transfer of Care Note    Patient: Jerman Ayon Jr.    Procedure(s) Performed: Procedure(s) (LRB):  RELEASE, DUPUYTREN CONTRACTURE (Left)  EXCISION, CYST (Left)    Patient location: PACU    Anesthesia Type: general    Transport from OR: Transported from OR on room air with adequate spontaneous ventilation    Post pain: adequate analgesia    Post assessment: no apparent anesthetic complications and tolerated procedure well    Post vital signs: stable    Level of consciousness: sedated    Nausea/Vomiting: no nausea/vomiting    Complications: none    Transfer of care protocol was followed      Last vitals: Visit Vitals  BP (!) 89/53   Pulse 70   Temp 36.6 °C (97.8 °F)   Resp 14   SpO2 (!) 94%

## 2024-06-26 NOTE — OP NOTE
[facility] - Surgery  Operative Note      Date of Procedure: 6/26/2024     Procedure: Procedure(s) (LRB):  RELEASE, DUPUYTREN CONTRACTURE (Left)  EXCISION, CYST (Left)     Surgeons and Role:     * Ranjan Alvarado Jr., MD - Primary    Assisting Surgeon: None    Pre-Operative Diagnosis: Dupuytren's contracture of left hand [M72.0]    Post-Operative Diagnosis: Post-Op Diagnosis Codes:     * Dupuytren's contracture of left hand [M72.0]    Anesthesia: General    Operative Findings (including complications, if any):  Dupuytren contracture left hand severe    Description of Technical Procedures:     Preop diagnosis:  1. Dupuytren contracture left middle finger.      2. Dupuytren contracture left ring and small finger.      3. Volar ganglion cyst left wrist.      Postop diagnosis: Same.      Operative procedure:  1. Partial palmar fasciectomy with Dupuytren's excision left middle finger.      2. Palmar fasciectomy and Dupuytren excision left ring and small finger.      3. Excisional biopsy ganglion cyst volar left wrist 1 cm.      Surgeon: Christiano.      First assistant:  Del.      Anesthesia: General.      EBL: Minimal.      Specimen: Ganglion cyst, Dupuytren fascia.      Operative procedure in detail as follows:     After operative consent was obtained patient brought the operating room placed supine on the operating table.  Anesthesia by general endotracheal method performed by the anesthesia staff.  After the patient was asleep a tourniquet applied left arm left upper extremity prepped and draped out in the normal sterile fashion.  Esmarch used to exsanguinated the limb and the tourniquet inflated 225 mmHg.      The cyst was approached 1st with a volar chevron incision on the radial side of the wrist with a 15 blade full-thickness skin flaps raised hemostasis achieved with the Bovie.  The ganglion cyst was identified and traced down to its capsular attachment where it was excised and a capsulectomy performed hemostasis  achieved with the Bovie the wound irrigated and closed with interrupted 5 O nylon horizontal mattress suture.      A 2nd incision made in the mid palm again chevron chevron orientation using the 15 blade.  Full-thickness skin flaps were raised carefully  the Dupuytren's fascia and nodules from the overlying tissue.  After completely identifying the fascia in the neurovascular bundles to the middle finger the fascia was completely excised and sent for pathologic exam hemostasis achieved with the Bovie the wound irrigated and closed with a running 5 O nylon horizontal mattress suture.      A 3rd incision made from the ulnar side of the wrist and the proximal palm all the way out to the D IP crease of the small finger in a zigzag fashion with a 15 blade full-thickness skin flaps raised from proximal to distal carefully  the underlying Dupuytren's fascia significant cords and contractures were noted at the MP and PIP joints.  The neurovascular bundles to the middle ring and small finger all carefully identified and protected throughout the procedure.  Beginning proximally the Dupuytren's fascia was excised and the finger released at both the MP and PIP joint.  After removal of all abnormal fascia of the finger was brought into full extension without difficulty.  V-Y advancement cuts were made in the skin at each edge of the zigzag and following this the wound irrigated with antibiotic saline solution hemostasis achieved with the Bovie the skin closed with a running and interrupted 5 O nylon horizontal mattress suture.  Sterile bulky bandage applied followed by a volar splint tourniquet deflated and the patient brought to the recovery room in stable condition.  All sponge needle counts reported as correct no complications    Significant Surgical Tasks Conducted by the Assistant(s), if Applicable: retraction    Estimated Blood Loss (EBL): * No values recorded between 6/26/2024 12:29 PM and 6/26/2024   2:04 PM *           Implants: * No implants in log *    Specimens:   Specimen (24h ago, onward)       Start     Ordered    06/26/24 1238  Specimen to Pathology, Surgery Orthopedics  Once        Comments: Pre-op Diagnosis: Dupuytren's contracture of left hand [M72.0]Procedure(s):RELEASE, DUPUYTREN CONTRACTUREEXCISION, CYST Number of specimens: 3Name of specimens: 1. Cyst 2. Dupuytren 3. DupuytrenDr. Maltese     References:    Click here for ordering Quick Tip   Question Answer Comment   Procedure Type: Orthopedics    Release to patient Immediate        06/26/24 1327                            Condition: Good    Disposition: PACU - hemodynamically stable.    Attestation: I was present and scrubbed for the entire procedure.    Discharge Note    OUTCOME: Patient tolerated treatment/procedure well without complication and is now ready for discharge.    DISPOSITION: Home or Self Care    FINAL DIAGNOSIS:  Dupuytren's contracture of left hand    FOLLOWUP: In clinic    DISCHARGE INSTRUCTIONS:    Discharge Procedure Orders   Diet general     Call MD for:  temperature >100.4     Call MD for:  persistent nausea and vomiting     Call MD for:  severe uncontrolled pain     Keep surgical extremity elevated     Change dressing (specify)   Order Comments: Dressing change:  Remove bandage in 5 days then used gentle soap and water and Neosporin ointment on the sutures

## 2024-06-26 NOTE — H&P
CC:  Dupuytren contracture left hand involving middle and small fingers           HPI:  Jerman Ayon Jr. is a very pleasant 64 y.o. male with worsening contracture left hand for the past several years   Mostly involving the left small finger he also has involvement of the middle finger   Symptoms worse with use   No numbness or tingling except for the small finger on the ulnar border            PAST MEDICAL HISTORY: No past medical history on file.  PAST SURGICAL HISTORY:         Past Surgical History:   Procedure Laterality Date    EXCISION OF ZENKER'S DIVERTICULUM          FAMILY HISTORY: No family history on file.  SOCIAL HISTORY:   Social History            Socioeconomic History    Marital status:    Tobacco Use    Smoking status: Every Day       Current packs/day: 1.00       Average packs/day: 1 pack/day for 40.0 years (40.0 ttl pk-yrs)       Types: Cigarettes    Smokeless tobacco: Never         MEDICATIONS:   Current Medications   Current Outpatient Medications:     aspirin 81 MG Chew, 1 tablet Orally Once a day, Disp: , Rfl:     lisinopriL 10 MG tablet, 1 tablet Orally Once a day for 90 days, Disp: , Rfl:      ALLERGIES: Review of patient's allergies indicates:  No Known Allergies     Review of Systems:  Constitutional: no fever or chills  ENT: no nasal congestion or sore throat  Respiratory: no cough or shortness of breath  Cardiovascular: no chest pain or palpitations  Gastrointestinal: no nausea or vomiting, PUD, GERD, NSAID intolerance  Genitourinary: no hematuria or dysuria  Integument/Breast: no rash or pruritis  Hematologic/Lymphatic: no easy bruising or lymphadenopathy  Musculoskeletal: see HPI  Neurological: no seizures or tremors  Behavioral/Psych: no auditory or visual hallucinations        Physical Exam   There were no vitals filed for this visit.     Constitutional: Oriented to person, place, and time. Appears well-developed and well-nourished.   HENT:   Head: Normocephalic and atraumatic.  "  Nose: Nose normal.   Eyes: No scleral icterus.   Neck: Normal range of motion.   Cardiovascular: Normal rate and regular rhythm.    Pulses:       Radial pulses are 2+ on the right side, and 2+ on the left side.   Pulmonary/Chest: Effort normal and breath sounds normal.   Abdominal: Soft.   Neurological: Alert and oriented to person, place, and time.   Skin: Skin is warm.   Psychiatric: Normal mood and affect.      MUSCULOSKELETAL UPPER EXTREMITY:  Examination left hand he does have Dupuytren's contracture significant cord going into the small finger causing contracture of the MP and PIP joints about 45°   Slightly decreased sensation over the ulnar border of the small finger he has good flexion a 2nd cord is noted in the mid palm to the base of the middle finger causing a slight contracture of the MP joint   Positive tabletop test Tinel sign negative at the wrist                 Diagnostic Studies:  None           Assessment:  Dupuytren contracture left hand involving middle finger and small finger     Plan:  I explained the nature of the problem to the patient   I have recommended surgical treatment for Dupuytren's excision   He would like to set this up as an outpatient surgery        The risks and benefits of surgery were discussed with the patient today and they understand.  The consent was signed in the office for surgery.        Ranjan Alvarado MD (Jay)  [facility]  Orthopedic Upper Extremity Surgery     "

## 2024-06-26 NOTE — ANESTHESIA PREPROCEDURE EVALUATION
06/26/2024  Jerman Ayon Jr. is a 64 y.o., male.      Pre-op Assessment    I have reviewed the Patient Summary Reports.     I have reviewed the Nursing Notes. I have reviewed the NPO Status.   I have reviewed the Medications.     Review of Systems  Anesthesia Hx:  No problems with previous Anesthesia                Social:  Recreational Drugs, Smoker, Alcohol Use       Cardiovascular:  Cardiovascular Normal Exercise tolerance: good                 ECG has been reviewed.                          Pulmonary:  Pulmonary Normal                       Renal/:  Renal/ Normal                 Hepatic/GI:  Hepatic/GI Normal                 Musculoskeletal:  Musculoskeletal Normal                Neurological:  Neurology Normal                                      Endocrine:  Endocrine Normal            Dermatological:  Skin Normal    Psych:  Psychiatric Normal                  Physical Exam  General: Well nourished    Airway:  Mallampati: II   Mouth Opening: Normal  TM Distance: Normal  Tongue: Normal  Neck ROM: Normal ROM    Chest/Lungs:  Clear to auscultation    Heart:  Rate: Normal  Rhythm: Regular Rhythm  Sounds: Normal      Anesthesia Plan  Type of Anesthesia, risks & benefits discussed:    Anesthesia Type: Gen Supraglottic Airway  Intra-op Monitoring Plan: Standard ASA Monitors  Post Op Pain Control Plan: multimodal analgesia  Induction:  IV  Airway Plan: Direct  Informed Consent: Informed consent signed with the Patient and all parties understand the risks and agree with anesthesia plan.  All questions answered.   ASA Score: 2  Day of Surgery Review of History & Physical: H&P Update referred to the surgeon/provider.I have interviewed and examined the patient. I have reviewed the patient's H&P dated: 6/26/24. There are no significant changes.     Ready For Surgery From Anesthesia Perspective.     .

## 2024-07-05 LAB
FINAL PATHOLOGIC DIAGNOSIS: NORMAL
GROSS: NORMAL
Lab: NORMAL

## 2024-07-10 ENCOUNTER — OFFICE VISIT (OUTPATIENT)
Dept: ORTHOPEDICS | Facility: CLINIC | Age: 64
End: 2024-07-10
Payer: MEDICAID

## 2024-07-10 VITALS — BODY MASS INDEX: 22.58 KG/M2 | HEIGHT: 68 IN | WEIGHT: 149 LBS | RESPIRATION RATE: 18 BRPM

## 2024-07-10 DIAGNOSIS — Z09 FOLLOW-UP EXAMINATION AFTER ORTHOPEDIC SURGERY: ICD-10-CM

## 2024-07-10 DIAGNOSIS — M72.0 DUPUYTREN'S CONTRACTURE OF LEFT HAND: Primary | ICD-10-CM

## 2024-07-10 PROCEDURE — 99999 PR PBB SHADOW E&M-EST. PATIENT-LVL III: CPT | Mod: PBBFAC,,, | Performed by: PHYSICIAN ASSISTANT

## 2024-07-10 PROCEDURE — 1160F RVW MEDS BY RX/DR IN RCRD: CPT | Mod: CPTII,,, | Performed by: PHYSICIAN ASSISTANT

## 2024-07-10 PROCEDURE — 4010F ACE/ARB THERAPY RXD/TAKEN: CPT | Mod: CPTII,,, | Performed by: PHYSICIAN ASSISTANT

## 2024-07-10 PROCEDURE — 1125F AMNT PAIN NOTED PAIN PRSNT: CPT | Mod: CPTII,,, | Performed by: PHYSICIAN ASSISTANT

## 2024-07-10 PROCEDURE — 99213 OFFICE O/P EST LOW 20 MIN: CPT | Mod: PBBFAC | Performed by: PHYSICIAN ASSISTANT

## 2024-07-10 PROCEDURE — 1159F MED LIST DOCD IN RCRD: CPT | Mod: CPTII,,, | Performed by: PHYSICIAN ASSISTANT

## 2024-07-10 PROCEDURE — 99024 POSTOP FOLLOW-UP VISIT: CPT | Mod: ,,, | Performed by: PHYSICIAN ASSISTANT

## 2024-07-10 NOTE — PROGRESS NOTES
Pt presents for post-op evaluation. He is 2 weeks s/p     1. Partial palmar fasciectomy with Dupuytren's excision left middle finger.    2. Palmar fasciectomy and Dupuytren excision left ring and small finger.    3. Excisional biopsy ganglion cyst volar left wrist 1 cm.    with Dr. Alvarado. Pt has no complaints at this time. Pt is taking pain meds as needed. Denies fever, chills, discharge from wound site, and N/V.     Left hand/wrist:  Incision healing well  No erythema, warmth, swelling, drainage, or any other signs of infection  Neurovascular status intact in extremity        A/P:  S/p  1. Partial palmar fasciectomy with Dupuytren's excision left middle finger.    2. Palmar fasciectomy and Dupuytren excision left ring and small finger.    3. Excisional biopsy ganglion cyst volar left wrist 1 cm.     1. Wound care discussed with pt.  2. Advance activity as tolerated, no heavy lifting.  3. OT referral.   4. Return to clinic as scheduled for follow up, sooner if needed.    Patient voices understanding of and agreement with treatment plan. All of the patient's questions were answered, and the patient will contact us if he has any questions or concerns in the interim.

## 2024-08-07 ENCOUNTER — OFFICE VISIT (OUTPATIENT)
Dept: ORTHOPEDICS | Facility: CLINIC | Age: 64
End: 2024-08-07
Payer: MEDICAID

## 2024-08-07 VITALS
SYSTOLIC BLOOD PRESSURE: 126 MMHG | BODY MASS INDEX: 22.68 KG/M2 | OXYGEN SATURATION: 97 % | HEART RATE: 78 BPM | HEIGHT: 68 IN | RESPIRATION RATE: 16 BRPM | WEIGHT: 149.63 LBS | DIASTOLIC BLOOD PRESSURE: 74 MMHG

## 2024-08-07 DIAGNOSIS — Z09 FOLLOW-UP EXAMINATION AFTER ORTHOPEDIC SURGERY: Primary | ICD-10-CM

## 2024-08-07 DIAGNOSIS — M72.0 DUPUYTREN'S CONTRACTURE OF LEFT HAND: ICD-10-CM

## 2024-08-07 PROCEDURE — 3074F SYST BP LT 130 MM HG: CPT | Mod: CPTII,,, | Performed by: PHYSICIAN ASSISTANT

## 2024-08-07 PROCEDURE — 99024 POSTOP FOLLOW-UP VISIT: CPT | Mod: ,,, | Performed by: PHYSICIAN ASSISTANT

## 2024-08-07 PROCEDURE — 3078F DIAST BP <80 MM HG: CPT | Mod: CPTII,,, | Performed by: PHYSICIAN ASSISTANT

## 2024-08-07 PROCEDURE — 1160F RVW MEDS BY RX/DR IN RCRD: CPT | Mod: CPTII,,, | Performed by: PHYSICIAN ASSISTANT

## 2024-08-07 PROCEDURE — 1125F AMNT PAIN NOTED PAIN PRSNT: CPT | Mod: CPTII,,, | Performed by: PHYSICIAN ASSISTANT

## 2024-08-07 PROCEDURE — 99999 PR PBB SHADOW E&M-EST. PATIENT-LVL III: CPT | Mod: PBBFAC,,, | Performed by: PHYSICIAN ASSISTANT

## 2024-08-07 PROCEDURE — 4010F ACE/ARB THERAPY RXD/TAKEN: CPT | Mod: CPTII,,, | Performed by: PHYSICIAN ASSISTANT

## 2024-08-07 PROCEDURE — 1159F MED LIST DOCD IN RCRD: CPT | Mod: CPTII,,, | Performed by: PHYSICIAN ASSISTANT

## 2024-08-07 PROCEDURE — 99213 OFFICE O/P EST LOW 20 MIN: CPT | Mod: PBBFAC | Performed by: PHYSICIAN ASSISTANT

## 2024-08-07 RX ORDER — TRAMADOL HYDROCHLORIDE 50 MG/1
50 TABLET ORAL EVERY 12 HOURS PRN
Qty: 10 TABLET | Refills: 0 | Status: SHIPPED | OUTPATIENT
Start: 2024-08-07

## 2024-08-07 RX ORDER — ERGOCALCIFEROL 1.25 MG/1
50000 CAPSULE ORAL
COMMUNITY

## (undated) DEVICE — SUT ETHILON 3-0 PSL 30 BLK

## (undated) DEVICE — SPLINT WRIST FOAM 8.8IN LG/LFT

## (undated) DEVICE — SUT ETHILON 5-0 PS-2 18IN

## (undated) DEVICE — DRESSING XEROFORM GAUZE 5X9